# Patient Record
Sex: MALE | Race: WHITE
[De-identification: names, ages, dates, MRNs, and addresses within clinical notes are randomized per-mention and may not be internally consistent; named-entity substitution may affect disease eponyms.]

---

## 2018-03-02 ENCOUNTER — HOSPITAL ENCOUNTER (EMERGENCY)
Dept: HOSPITAL 25 - ED | Age: 83
Discharge: HOME | End: 2018-03-02
Payer: MEDICARE

## 2018-03-02 VITALS — SYSTOLIC BLOOD PRESSURE: 111 MMHG | DIASTOLIC BLOOD PRESSURE: 67 MMHG

## 2018-03-02 DIAGNOSIS — D68.32: Primary | ICD-10-CM

## 2018-03-02 DIAGNOSIS — Z87.891: ICD-10-CM

## 2018-03-02 DIAGNOSIS — T45.525A: ICD-10-CM

## 2018-03-02 DIAGNOSIS — R04.0: ICD-10-CM

## 2018-03-02 DIAGNOSIS — I10: ICD-10-CM
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
## 2018-03-02 DIAGNOSIS — I25.10: ICD-10-CM

## 2018-03-02 DIAGNOSIS — E78.00: ICD-10-CM

## 2018-03-02 DIAGNOSIS — K21.9: ICD-10-CM

## 2018-03-02 PROCEDURE — 99284 EMERGENCY DEPT VISIT MOD MDM: CPT

## 2018-03-02 NOTE — ED
Throat Pain/Nasal Congestion





- HPI Summary


HPI Summary: 





81yo M presents with nosebleed since 04:30am today. He is on Brillinta for his 

cardiac dz/stent. He was choking on the blood from his nose. No weakness/

lightheadedness. No other blood thinners. This awoke him from sleep. He denies 

any CP, SOB or near syncope. He has not had this happen before. Began from the 

R nare but now involves both.





- History of Current Complaint


Chief Complaint: EDEpistaxis


Time Seen by Provider: 03/02/18 06:09





- Allergies/Home Medications


Allergies/Adverse Reactions: 


 Allergies











Allergy/AdvReac Type Severity Reaction Status Date / Time


 


MS Penicillins [Penicillins] Allergy  Anaphylatic Verified 11/23/15 11:12





   Shock  














PMH/Surg Hx/FS Hx/Imm Hx


Endocrine/Hematology History: 


   Denies: Hx Diabetes


Cardiovascular History: Reports: Hx Angina, Hx Coronary Artery Disease, Hx 

Hypercholesterolemia, Hx Hypertension


   Denies: Hx Myocardial Infarction, Hx Valvular Heart Disease


Respiratory History: 


   Denies: Hx Asthma, Hx Chronic Obstructive Pulmonary Disease (COPD)


GI History: Reports: Hx Gastroesophageal Reflux Disease


Sensory History: Reports: Hx Contacts or Glasses, Hx Hearing Aid


Opthamlomology History: Reports: Hx Contacts or Glasses





- Surgical History


Surgery Procedure, Year, and Place: T&A, 1943, [place]





- Immunization History


Date of Tetanus Vaccine: Unk


Date of Influenza Vaccine: 11/15


Infectious Disease History: No


Infectious Disease History: 


   Denies: Traveled Outside the [place] in Last 30 Days





- Social History


Lives: With Family


Alcohol Use: None


Substance Use Type: Reports: None


Smoking Status (MU): Former Smoker


Type: Cigarettes


Have You Smoked in the Last Year: No





Review of Systems


Negative: Fatigue


Positive: Epistaxis.  Negative: Sore Throat


Negative: Palpitations, Chest Pain


Negative: Shortness Of Breath


Negative: Vomiting, Nausea


Negative: Weakness


All Other Systems Reviewed And Are Negative: Yes





Physical Exam


Triage Information Reviewed: Yes


Vital Signs On Initial Exam: 


 Initial Vitals











Temp Pulse Resp BP Pulse Ox


 


 36.4 C   102   18   123/86   96 


 


 03/02/18 06:00  03/02/18 06:00  03/02/18 06:00  03/02/18 06:00  03/02/18 06:00











Vital Signs Reviewed: Yes


Appearance: Positive: Thin - mild distress/discomfort from nose clamping


Skin: Positive: Warm, Skin Color Reflects Adequate Perfusion, Dry


Head/Face: Positive: Other - red blood dried on face and small stream from R 

nare.


Eyes: Positive: Normal, EOMI


ENT: Positive: Other - Blood in posterior pharynx. Large clots blown from each 

nare. Blood streams from posterior area R nare. No anterior site seen. No 

further bleeding on L.


Neck: Positive: Supple


Respiratory/Lung Sounds: Positive: Clear to Auscultation, Breath Sounds Present


Cardiovascular: Positive: Normal, RRR, Pulses are Symmetrical in both Upper and 

Lower Extremities


Abdomen Description: Positive: Nontender


Musculoskeletal: Positive: Normal, Strength/ROM Intact


Neurological: Positive: Normal, Sensory/Motor Intact, Alert, Oriented to Person 

Place, Time


Psychiatric: Positive: Normal


AVPU Assessment: Alert





Procedures





- Procedure Summary


Procedure Summary: 





Management of Epistaxis:


Pt blew large clots from both nares. Active bleeding began on Right. He was 

packed with Afrin soaked cotton balls which slowed bleeding significantly. The 

anterior cotton was removed from the R nare and no anterior site was seen. When 

the posterior portion was removed there began a trickle of red blood from 

unseen site. A Merocel (ant/post) packing was placed and this stopped bleeding. 

He had a short vagal episode thereafter but recovered quickly. No other 

complications. He was watched in the ED for 45min and discharged in good 

condition. After antibiotics. 





Diagnostics





- Vital Signs


 Vital Signs











  Temp Pulse Resp BP Pulse Ox


 


 03/02/18 06:00  36.4 C  102  18  123/86  96














- Laboratory


Lab Statement: Any lab studies that have been ordered have been reviewed, and 

results considered in the medical decision making process.





Re-Evaluation





- Re-Evaluation


  ** First Eval


Change: Improved - No further bleeding





EENT Course/Dx





- Course


Course Of Treatment: Epistaxis stopped with afrin and merocel packing. Started 

abx. D/C to PMD f/u on Monday. To have packing removed then.





- Diagnoses


Provider Diagnoses: 


 Posterior epistaxis, Adverse effect of antiplatelet agent








Discharge





- Discharge Plan


Condition: Good


Disposition: HOME


Prescriptions: 


Azithromycin TAB* [Zithromax TAB (Z-JACK) 250 mg #6 tabs] 250 mg PO DAILY #3 tab


Patient Education Materials:  Nosebleed (ED)


Referrals: 


Cyril Obrien MD [Primary Care Provider] - 


Additional Instructions: 


Packing to be removed on Monday by your doctor. Humidifier while sleeping. Hold 

Brillinta today. May take your aspirin. Once removed -- use bacitracin ointment 

in both nares before bed and saline nose spray several times each day. Return 

if worse, new symptoms or other concerns as discussed.